# Patient Record
Sex: FEMALE | Race: WHITE | NOT HISPANIC OR LATINO | URBAN - METROPOLITAN AREA
[De-identification: names, ages, dates, MRNs, and addresses within clinical notes are randomized per-mention and may not be internally consistent; named-entity substitution may affect disease eponyms.]

---

## 2020-07-07 ENCOUNTER — OFFICE (OUTPATIENT)
Dept: URBAN - METROPOLITAN AREA CLINIC 79 | Facility: CLINIC | Age: 70
End: 2020-07-07
Payer: COMMERCIAL

## 2020-07-07 VITALS
DIASTOLIC BLOOD PRESSURE: 78 MMHG | HEIGHT: 64 IN | SYSTOLIC BLOOD PRESSURE: 140 MMHG | TEMPERATURE: 97.3 F | WEIGHT: 179 LBS

## 2020-07-07 DIAGNOSIS — R19.4 CHANGE IN BOWEL HABIT: ICD-10-CM

## 2020-07-07 PROCEDURE — 99204 OFFICE O/P NEW MOD 45 MIN: CPT | Performed by: PHYSICIAN ASSISTANT

## 2020-07-07 NOTE — SERVICEHPINOTES
SARITA TYLER   is a   69   year old    female who is being seen in consultation at the request of   SHELBIE BOOTHE   for constipation. Patient states she had feeling of tight muscle in rectal area about 3 months ago. She currently still feels some tightness in rectal area and has difficulty passing stool. She has been using prune juice and will pass soft stools with that. She says she hasn't had success with Miralax. Also just started on Linzess but denies improvement with that. No blood in stools. She states she used to have normal BMs until a few months ago. She recently reports a CT of her abd/pelvis which she said was negative, as was a pelvic U/S. Last colonoscopy was in 1983 and she reports a rectal prolapse at that time. She reports lumbar spine surgery within the past couple of months and is using a walker. No significant abdominal pain or other concerns today. Recent CBC and CMP were ok.

## 2020-08-05 ENCOUNTER — ON CAMPUS - OUTPATIENT (OUTPATIENT)
Dept: URBAN - METROPOLITAN AREA HOSPITAL 16 | Facility: HOSPITAL | Age: 70
End: 2020-08-05
Payer: COMMERCIAL

## 2020-08-05 DIAGNOSIS — D12.0 BENIGN NEOPLASM OF CECUM: ICD-10-CM

## 2020-08-05 DIAGNOSIS — D12.2 BENIGN NEOPLASM OF ASCENDING COLON: ICD-10-CM

## 2020-08-05 DIAGNOSIS — R19.4 CHANGE IN BOWEL HABIT: ICD-10-CM

## 2020-08-05 PROCEDURE — 45380 COLONOSCOPY AND BIOPSY: CPT | Performed by: INTERNAL MEDICINE
